# Patient Record
Sex: FEMALE | Race: WHITE | NOT HISPANIC OR LATINO | URBAN - METROPOLITAN AREA
[De-identification: names, ages, dates, MRNs, and addresses within clinical notes are randomized per-mention and may not be internally consistent; named-entity substitution may affect disease eponyms.]

---

## 2023-03-20 LAB — GROUP B STREP SCREEN: NORMAL

## 2024-06-10 PROBLEM — R11.2 NAUSEA AND/OR VOMITING: Status: ACTIVE | Noted: 2024-06-10

## 2024-06-10 PROBLEM — O20.9 BLEEDING IN EARLY PREGNANCY (HHS-HCC): Status: ACTIVE | Noted: 2024-06-10

## 2024-06-12 ENCOUNTER — OFFICE VISIT (OUTPATIENT)
Dept: OTOLARYNGOLOGY | Facility: CLINIC | Age: 29
End: 2024-06-12
Payer: COMMERCIAL

## 2024-06-12 VITALS
WEIGHT: 188 LBS | HEIGHT: 66 IN | SYSTOLIC BLOOD PRESSURE: 120 MMHG | HEART RATE: 90 BPM | DIASTOLIC BLOOD PRESSURE: 83 MMHG | BODY MASS INDEX: 30.22 KG/M2 | TEMPERATURE: 98.4 F

## 2024-06-12 DIAGNOSIS — H93.8X3 SENSATION OF FULLNESS IN BOTH EARS: ICD-10-CM

## 2024-06-12 DIAGNOSIS — R06.83 SNORING: Primary | ICD-10-CM

## 2024-06-12 PROCEDURE — 99213 OFFICE O/P EST LOW 20 MIN: CPT | Performed by: NURSE PRACTITIONER

## 2024-06-12 PROCEDURE — 1036F TOBACCO NON-USER: CPT | Performed by: NURSE PRACTITIONER

## 2024-06-12 PROCEDURE — 99203 OFFICE O/P NEW LOW 30 MIN: CPT | Performed by: NURSE PRACTITIONER

## 2024-06-12 SDOH — ECONOMIC STABILITY: FOOD INSECURITY: WITHIN THE PAST 12 MONTHS, THE FOOD YOU BOUGHT JUST DIDN'T LAST AND YOU DIDN'T HAVE MONEY TO GET MORE.: NEVER TRUE

## 2024-06-12 SDOH — ECONOMIC STABILITY: FOOD INSECURITY: WITHIN THE PAST 12 MONTHS, YOU WORRIED THAT YOUR FOOD WOULD RUN OUT BEFORE YOU GOT MONEY TO BUY MORE.: NEVER TRUE

## 2024-06-12 ASSESSMENT — COLUMBIA-SUICIDE SEVERITY RATING SCALE - C-SSRS
2. HAVE YOU ACTUALLY HAD ANY THOUGHTS OF KILLING YOURSELF?: NO
1. IN THE PAST MONTH, HAVE YOU WISHED YOU WERE DEAD OR WISHED YOU COULD GO TO SLEEP AND NOT WAKE UP?: NO
6. HAVE YOU EVER DONE ANYTHING, STARTED TO DO ANYTHING, OR PREPARED TO DO ANYTHING TO END YOUR LIFE?: NO

## 2024-06-12 ASSESSMENT — LIFESTYLE VARIABLES
HOW OFTEN DO YOU HAVE SIX OR MORE DRINKS ON ONE OCCASION: NEVER
SKIP TO QUESTIONS 9-10: 1
HOW MANY STANDARD DRINKS CONTAINING ALCOHOL DO YOU HAVE ON A TYPICAL DAY: 1 OR 2
HOW OFTEN DO YOU HAVE A DRINK CONTAINING ALCOHOL: 2-4 TIMES A MONTH
AUDIT-C TOTAL SCORE: 2

## 2024-06-12 ASSESSMENT — PATIENT HEALTH QUESTIONNAIRE - PHQ9
2. FEELING DOWN, DEPRESSED OR HOPELESS: NOT AT ALL
SUM OF ALL RESPONSES TO PHQ9 QUESTIONS 1 AND 2: 0
1. LITTLE INTEREST OR PLEASURE IN DOING THINGS: NOT AT ALL

## 2024-06-12 ASSESSMENT — ENCOUNTER SYMPTOMS
LOSS OF SENSATION IN FEET: 0
DEPRESSION: 0
OCCASIONAL FEELINGS OF UNSTEADINESS: 0

## 2024-06-12 ASSESSMENT — PAIN SCALES - GENERAL: PAINLEVEL: 0-NO PAIN

## 2024-06-12 NOTE — PROGRESS NOTES
Subjective   Patient ID: Tawanna Clayton is a 28 y.o. female who presents for Ear Fullness.    HPI  Patient here for her ears. In October she had an ear infection in her left ear. For weeks she had pressure in her head and it went to ear. She completed ear drops with no improvement. Was having pain so went to Urgent Care- was prescribed oral antibiotics and ear drops again. She was having a lot of pressure in the area. She gets pressure in her ears and her sinuses. Now the ears are feeling good, but can plug up throughout the seasons, more in the winter. A couple of years ago she had the same thing that was accompanied with vertigo. No trouble hearing now. She had tinnitus back when she had vertigo.   Does not use any nasal sprays regularly.   Denies previous ear surgery, loud noise exposure, and family history of hearing loss.      Also has a concern for her snoring. She feels she gets good sleep. Denies fatigue throughout the day.   Has nasal congestion at night. Always a mouth breather. Has gained some weight recently.    Patient Active Problem List   Diagnosis    Nausea and/or vomiting    Bleeding in early pregnancy ([facility])     History reviewed. No pertinent surgical history.    Review of Systems    All other systems have been reviewed and are negative for complaints except for those mentioned in history of present illness, past medical history and problem list.    Objective   Physical Exam    Constitutional: No fever, chills, weight loss or weight gain  General appearance: Appears well, well-nourished, well groomed. No acute distress.    Communication: Normal communication    Psychiatric: Oriented to person, place and time. Normal mood and affect.    Neurologic: Cranial nerves II-XII grossly intact and symmetric bilaterally.    Head and Face:  Head: Atraumatic with no masses, lesions or scarring.  Face: Normal symmetry. No scars or deformities.  TMJ: Normal, no trismus.    Eyes: Conjunctiva not edematous or  erythematous.     Right Ear: External inspection of ear with no deformity, scars, or masses. EAC is clear.  TM is intact with no sign of infection, effusion, or retraction.  No perforation seen.     Left Ear: External inspection of ear with no deformity, scars, or masses. EAC is clear.  TM is intact with no sign of infection, effusion, or retraction.  No perforation seen.     Nose: External inspection of nose: No nasal lesions, lacerations or scars. Anterior rhinoscopy with limited visualization past the inferior turbinates. No tenderness on frontal or maxillary sinus palpation.    Oral Cavity/Mouth: Oral cavity and oropharynx mucosa moist and pink. No lesions or masses. Dentition normal. Tonsils appear normal. Uvula is midline. Tongue with no masses or lesions. Tongue with good mobility. The oropharynx is clear.    Neck: Normal appearing, symmetric, trachea midline.     Cardiovascular: Examination of peripheral vascular system shows no clubbing or cyanosis.    Respiratory: No respiratory distress increased work of breathing. Inspection of the chest with symmetric chest expansion and normal respiratory effort.    Skin: No head and neck rashes.    Lymph nodes: No adenopathy.     Assessment/Plan   Diagnoses and all orders for this visit:  Sensation of fullness in both ears  Reassurance given that ears look healthy on exam today. I recommend obtaining an audiogram, I will follow up with results. We also discussed ETD, and to use Flonase during the months she tends to get facial pressure and ear pressure.   Snoring   Conservative therapies should be encouraged for all patients who snore. These include smoking cessation, weight loss, alcohol avoidance several hours before bedtime, and sleeping in the lateral position. Can also try nasal cones and breathe rite strips. Can also start Flonase nasal spray to help with the nasal congestion at night. If not improving we will consider sleep study vs referral to sleep medicine.      All questions answered to patient satisfaction.        GIANNI Jeffries-CNP 06/12/24 7:48 AM

## 2024-06-12 NOTE — PATIENT INSTRUCTIONS
- No evidence of ear infection on exam today. Your ears look healthy. Please obtain audiogram to further evaluate.     - Conservative therapies should be encouraged for all patients who snore. These include smoking cessation, weight loss, alcohol avoidance several hours before bedtime, and sleeping in the lateral position. Can also try nasal cones and breathe rite strips. Can also start Flonase nasal spray to help with the nasal congestion at night. If not improving we will consider sleep study vs referral to sleep medicine.     - Follow up as needed.    Welcome to Marianne Delgado's clinic. We are here to assist you through your ENT care at [facility].  Marianne is a Nurse Practitioner who specializes in General ENT. This means that she specializes in taking care of patients with usual ENT issues such as nasal congestion, allergy symptoms, sinusitis, hearing loss, ear infections, ear wax removal, hoarseness, sore throat, throat infections, reflux and some swallowing issues. She also sees patients regarding dizziness and vertigo.   [place] is Marianne's  and she answers the office phone from 7:30am-4pm Mon-Fri. Call [phone]. She can help you with scheduling of appointments, and general questions and information. You may need to leave a message if she is helping another patient. In this case, someone from the team will call you back the same day if you leave your message before 3pm, or the next business morning.  Marianne currently sees patients at [facility] on Mondays, Wednesdays and Thursdays.  She works closely with audiologists to solve issues with hearing. She is also in very close contact with her collaborative physicians. Dr. Duffy, a surgeon who specializes in general ENT and rhinology. She also works closely with otologist (ear surgeon) Dr. Franklin and head and neck surgery Dr. Avila.   Others who may be included in your care are dieticians, social workers,  allergists, gastroenterologists, neurologists, and physical therapists. Marianne will provide these referrals as needed. Please let her know if you would like to request a specific referral.  Marianne makes every effort to run on time for your appointments. Therefore, if you are more than 15 minutes late unrelated to a scan or another appointment such as therapy or audiology, your appointment will need to be rescheduled for another day. We appreciate your understanding.   We look forward to working with you to meet your healthcare goals.

## 2024-06-14 ENCOUNTER — CLINICAL SUPPORT (OUTPATIENT)
Dept: AUDIOLOGY | Facility: CLINIC | Age: 29
End: 2024-06-14
Payer: COMMERCIAL

## 2024-06-14 DIAGNOSIS — H93.8X3 EAR PRESSURE, BILATERAL: Primary | ICD-10-CM

## 2024-06-14 PROCEDURE — 92557 COMPREHENSIVE HEARING TEST: CPT | Performed by: AUDIOLOGIST

## 2024-06-14 PROCEDURE — 92550 TYMPANOMETRY & REFLEX THRESH: CPT | Performed by: AUDIOLOGIST

## 2024-06-14 NOTE — PROGRESS NOTES
"AUDIOLOGY ADULT AUDIOMETRIC EVALUATION      Name:  Tawanna Clayton  :  1995  Age:  28 y.o.  Date of Evaluation:  2024    HISTORY  Reason for visit:  history of ear infection  Ms. Clayton is seen 24 at the request of Marianne Delgado CNP for an evaluation of hearing.      Chief complaint:    - left ear infection/left ear pressure/left ear pain in 2023 (moved to left ear after feelings of head pressure)  - intermittent face/ear pressure (bilateral)     - tinnitus for months and then vertigo, lasted 1 week and was treated with steroids and resolved (around )    Hearing loss:  good for the most part, sometimes cannot hear, symmetric per patient   Tinnitus:   none at this time  Otitis Media: 2023  Otologic surgical history:  denies  Dizziness/imbalance:  history of tinnitus and then vertigo in   Otalgia:  denies  Ear pressure/fullness:  intermittent ear/head pressure  History of excessive noise exposure:  denies  Other: none         EVALUATION  Please find audiogram in \"Media\" tab (Document Type:  Audiology Report) or included at the bottom of this note.    RESULTS   Otoscopic Evaluation: clear canals bilaterally       Immittance Measures (226 Hz probe tone):   Tympanometry is consistent with normal middle ear pressure and normal tympanic membrane mobility bilaterally.       Ipsilateral acoustic reflexes (500-4000 Hz) are present for 500-2000 Hz bilaterally.    Test technique:  standard behavioral technique via TDH earphones.  Reliability is good.    Pure Tone Audiometry:  Hearing sensitivity is within normal limits for 250-8000 Hz bilaterally.      Speech Audiometry:        Right Ear:  Speech Reception Threshold (SRT) was obtained at 5 dBHL                 Speech discrimination score was 100% in quiet when words were presented at 55 dBHL (10 item NU-6 ordered-by-difficulty list).        Left Ear:  Speech Reception Threshold (SRT) was obtained at 5 dBHL                 Speech " discrimination score was 100% in quiet when words were presented at 55 dBHL (10 item NU-6 ordered-by-difficulty list).      IMPRESSIONS:  Tympanometry is consistent with normal middle ear pressure and normal tympanic membrane mobility bilaterally.        Hearing sensitivity is within normal limits for 250-8000 Hz bilaterally.       RECOMMENDATIONS  Continue with medical follow-up with Marianne Delgado CNP.  Reassess hearing as medically indicated or if there is a concern for a change in hearing.    Continue with medical follow-up as indicated.       PATIENT EDUCATION  Discussed results and recommendations with patient.  Questions were addressed and the patient was encouraged to contact our department should concerns arise.       ELTON Townsend, CCC-A  Licensed Audiologist

## 2025-08-06 ENCOUNTER — APPOINTMENT (OUTPATIENT)
Dept: OBSTETRICS AND GYNECOLOGY | Facility: CLINIC | Age: 30
End: 2025-08-06
Payer: COMMERCIAL

## 2025-08-06 DIAGNOSIS — Z3A.08 8 WEEKS GESTATION OF PREGNANCY (HHS-HCC): Primary | ICD-10-CM

## 2025-08-06 DIAGNOSIS — O21.9 NAUSEA AND VOMITING IN PREGNANCY PRIOR TO 22 WEEKS GESTATION: ICD-10-CM

## 2025-08-06 DIAGNOSIS — Z34.90 PREGNANCY, UNSPECIFIED GESTATIONAL AGE (HHS-HCC): ICD-10-CM

## 2025-08-06 LAB — PREGNANCY TEST URINE, POC: POSITIVE

## 2025-08-06 RX ORDER — METOCLOPRAMIDE 5 MG/1
5 TABLET ORAL EVERY 8 HOURS PRN
Qty: 30 TABLET | Refills: 1 | Status: SHIPPED | OUTPATIENT
Start: 2025-08-06

## 2025-08-06 RX ORDER — PYRIDOXINE HCL (VITAMIN B6) 25 MG
25 TABLET ORAL DAILY
COMMUNITY

## 2025-08-06 ASSESSMENT — EDINBURGH POSTNATAL DEPRESSION SCALE (EPDS)
THINGS HAVE BEEN GETTING ON TOP OF ME: NO, I HAVE BEEN COPING AS WELL AS EVER
I HAVE BEEN ABLE TO LAUGH AND SEE THE FUNNY SIDE OF THINGS: AS MUCH AS I ALWAYS COULD
I HAVE BLAMED MYSELF UNNECESSARILY WHEN THINGS WENT WRONG: NO, NEVER
I HAVE BEEN ANXIOUS OR WORRIED FOR NO GOOD REASON: HARDLY EVER
I HAVE BEEN SO UNHAPPY THAT I HAVE HAD DIFFICULTY SLEEPING: NOT AT ALL
THE THOUGHT OF HARMING MYSELF HAS OCCURRED TO ME: NEVER
I HAVE FELT SAD OR MISERABLE: NO, NOT AT ALL
I HAVE FELT SCARED OR PANICKY FOR NO GOOD REASON: NO, NOT AT ALL
I HAVE BEEN SO UNHAPPY THAT I HAVE BEEN CRYING: NO, NEVER
TOTAL SCORE: 1
I HAVE LOOKED FORWARD WITH ENJOYMENT TO THINGS: AS MUCH AS I EVER DID

## 2025-08-06 NOTE — PROGRESS NOTES
Tawanna Clayton is a 30 y.o.  at 8w1d with a working estimated date of delivery of 3/17/2026, by Last Menstrual Period who presents for an initial prenatal visit. This pregnancy is planned.  Patient's last menstrual period was 06/10/2025.     Patient currently experiencing: Nausea, vomiting.  Taking B6 100 mg once daily and Unisom at bedtime with little relief.  Has old Rx for Zofran at home, though gets constipated from Zofran, trying to avoid taking.  Recommended Vitamin B6 50 mg 3x/day and Unisom 12.5 mg at bedtime.  Will send Rx for Reglan PRN.     Bleeding or cramping since LMP: no  Taking prenatal vitamin: Yes  Ultrasound completed this pregnancy: No      OB History    Para Term  AB Living   2 1 1   1   SAB IAB Ectopic Multiple Live Births       1      # Outcome Date GA Lbr Fabricio/2nd Weight Sex Type Anes PTL Lv   2 Current            1 Term 25 40w0d  3.26 kg F Vag-Spont None N MEJIA     [employer]  Depression Scale Total: 1  Prior pregnancy complications: None per patient.    History of hypertension:  No  Preeclampsia Risk - ASA prophylaxis; pt does meet criteria. Will start 162 mg. ASA daily at 12-16 weeks.    Medical History[1]     Last pap: ?  Hx Depression/Anxiety - No    Surgical History[2]     Social History[3]     Employment: Outpatient RN  [employer]    Routine PNC, patient appropriate for midwifery service. Oriented to practice, including available collaboration and consulting with physicians.   Discussed routine OB labs, including serum STI/HIV, CBC, blood type and screen.    Pregravid BMI: 29.87  Expected Total Weight Gain: 7 kg (15 lb)-11.5 kg (25 lb)     Education provided r/t nutrition, folic acid supplementation, dietary guidelines, exercise, smoking, alcohol, caffeine, and drug use.    Current Medications[4]     Genetic Testing - The patient was counselled regarding prenatal genetic testing options and was provided literature in the obstetric folder.  First line screening options, including cfDNA and first trimester ultrasound for nuchal translucency, were discussed and offered.  Benefits, drawbacks, and limitations were explained respectively.  It was clearly stated that only diagnostic testing (amniocentesis) provides definitive information regarding a genetic diagnosis in the fetus. It was discussed with the patient that the false positive rates for NIPT is higher for women under 35 years of age. The patient was informed that the cost of NIPT ranges and may not be covered by insurance depending on patient's age and risk factors. Patient aware that gender testing is available at a fraction of the cost through [employer].  This patient has decided to pursue.    Will also schedule for 11-13 week NT US.     Warning s/s discussed and SAB precautions reviewed.  F/U in 4 weeks -- Review U/S, needs new OB labs/NIPT, PE and pap.    GIANNI Martínez-THOMAS         [1] History reviewed. No pertinent past medical history.  [2] History reviewed. No pertinent surgical history.  [3]   Social History  Tobacco Use    Smoking status: Never    Smokeless tobacco: Never   Substance Use Topics    Alcohol use: Not Currently     Comment: weekly    Drug use: Never   [4]   Current Outpatient Medications:     prenatal 115/iron/folic acid (PRENATAL 19 ORAL), Take by mouth., Disp: , Rfl:     pyridoxine (Vitamin B-6) 25 mg tablet, Take 1 tablet (25 mg) by mouth once daily., Disp: , Rfl:

## 2025-08-08 LAB
BACTERIA UR CULT: NORMAL
C TRACH RRNA SPEC QL NAA+PROBE: NOT DETECTED
N GONORRHOEA RRNA SPEC QL NAA+PROBE: NOT DETECTED
QUEST GC CT AMPLIFIED (ALWAYS MESSAGE): NORMAL

## 2025-08-25 ENCOUNTER — PATIENT MESSAGE (OUTPATIENT)
Dept: OBSTETRICS AND GYNECOLOGY | Facility: CLINIC | Age: 30
End: 2025-08-25
Payer: COMMERCIAL

## 2025-08-25 DIAGNOSIS — O21.9 NAUSEA AND VOMITING IN PREGNANCY PRIOR TO 22 WEEKS GESTATION: Primary | ICD-10-CM

## 2025-08-27 RX ORDER — ONDANSETRON 4 MG/1
4 TABLET, ORALLY DISINTEGRATING ORAL EVERY 8 HOURS PRN
Qty: 30 TABLET | Refills: 1 | Status: SHIPPED | OUTPATIENT
Start: 2025-08-27

## 2025-08-28 RX ORDER — PROMETHAZINE HYDROCHLORIDE 25 MG/1
25 SUPPOSITORY RECTAL EVERY 8 HOURS
Qty: 60 SUPPOSITORY | Refills: 1 | Status: SHIPPED | OUTPATIENT
Start: 2025-08-28

## 2025-09-04 ENCOUNTER — HOSPITAL ENCOUNTER (OUTPATIENT)
Dept: RADIOLOGY | Facility: CLINIC | Age: 30
Discharge: HOME | End: 2025-09-04
Payer: COMMERCIAL

## 2025-09-04 DIAGNOSIS — Z34.90 PREGNANCY, UNSPECIFIED GESTATIONAL AGE (HHS-HCC): ICD-10-CM

## 2025-09-04 PROCEDURE — 76801 OB US < 14 WKS SINGLE FETUS: CPT | Performed by: OBSTETRICS & GYNECOLOGY

## 2025-09-04 PROCEDURE — 76813 OB US NUCHAL MEAS 1 GEST: CPT

## 2025-09-04 PROCEDURE — 76801 OB US < 14 WKS SINGLE FETUS: CPT

## 2025-09-04 PROCEDURE — 76813 OB US NUCHAL MEAS 1 GEST: CPT | Performed by: OBSTETRICS & GYNECOLOGY

## 2025-09-09 ENCOUNTER — APPOINTMENT (OUTPATIENT)
Dept: OBSTETRICS AND GYNECOLOGY | Facility: CLINIC | Age: 30
End: 2025-09-09
Payer: COMMERCIAL

## 2025-10-07 ENCOUNTER — APPOINTMENT (OUTPATIENT)
Dept: OBSTETRICS AND GYNECOLOGY | Facility: CLINIC | Age: 30
End: 2025-10-07
Payer: COMMERCIAL

## 2025-11-04 ENCOUNTER — APPOINTMENT (OUTPATIENT)
Dept: OBSTETRICS AND GYNECOLOGY | Facility: CLINIC | Age: 30
End: 2025-11-04
Payer: COMMERCIAL

## 2025-12-02 ENCOUNTER — APPOINTMENT (OUTPATIENT)
Dept: OBSTETRICS AND GYNECOLOGY | Facility: CLINIC | Age: 30
End: 2025-12-02
Payer: COMMERCIAL

## 2025-12-23 ENCOUNTER — APPOINTMENT (OUTPATIENT)
Dept: OBSTETRICS AND GYNECOLOGY | Facility: CLINIC | Age: 30
End: 2025-12-23
Payer: COMMERCIAL